# Patient Record
Sex: FEMALE | Race: WHITE | NOT HISPANIC OR LATINO | URBAN - METROPOLITAN AREA
[De-identification: names, ages, dates, MRNs, and addresses within clinical notes are randomized per-mention and may not be internally consistent; named-entity substitution may affect disease eponyms.]

---

## 2017-12-13 ENCOUNTER — OFFICE (OUTPATIENT)
Dept: URBAN - METROPOLITAN AREA CLINIC 101 | Facility: CLINIC | Age: 77
End: 2017-12-13
Payer: COMMERCIAL

## 2017-12-13 VITALS
WEIGHT: 153 LBS | HEART RATE: 73 BPM | DIASTOLIC BLOOD PRESSURE: 72 MMHG | SYSTOLIC BLOOD PRESSURE: 119 MMHG | HEIGHT: 61 IN | TEMPERATURE: 97.7 F

## 2017-12-13 DIAGNOSIS — R10.2 PELVIC AND PERINEAL PAIN: ICD-10-CM

## 2017-12-13 DIAGNOSIS — R13.19 OTHER DYSPHAGIA: ICD-10-CM

## 2017-12-13 DIAGNOSIS — K50.90 CROHN'S DISEASE, UNSPECIFIED, WITHOUT COMPLICATIONS: ICD-10-CM

## 2017-12-13 PROCEDURE — 99213 OFFICE O/P EST LOW 20 MIN: CPT

## 2017-12-13 NOTE — SERVICEHPINOTES
Ms. Toro returns for follow up. Her  passed away in February. She is doing ok and now seeing a grief counselor. She notes that she is still suffering from pelvic pain--continues to see a uro/gynecologist as well as a PT and pain specialist. She is on all of her same medications for her chronic pain. She wants to increase her dicyclomine use but is worried about getting a dry mouth from this. She has not tried FDgard. She is due for a colonoscopy this year for a hx of quiescent crohn's but she wants to hold off until after the holidays for this. She denies constipation, diarrhea, and rectal bleeding. She notes mild dysphagia again to certain things such as pills which she has had in the past. No other GI related complaints today.

## 2018-04-17 ENCOUNTER — OFFICE (OUTPATIENT)
Dept: URBAN - METROPOLITAN AREA CLINIC 101 | Facility: CLINIC | Age: 78
End: 2018-04-17

## 2018-04-17 PROCEDURE — 00031: CPT

## 2018-04-25 ENCOUNTER — ON CAMPUS - OUTPATIENT (OUTPATIENT)
Dept: URBAN - METROPOLITAN AREA HOSPITAL 35 | Facility: HOSPITAL | Age: 78
End: 2018-04-25
Payer: COMMERCIAL

## 2018-04-25 DIAGNOSIS — K29.60 OTHER GASTRITIS WITHOUT BLEEDING: ICD-10-CM

## 2018-04-25 DIAGNOSIS — K31.7 POLYP OF STOMACH AND DUODENUM: ICD-10-CM

## 2018-04-25 DIAGNOSIS — K51.80 OTHER ULCERATIVE COLITIS WITHOUT COMPLICATIONS: ICD-10-CM

## 2018-04-25 DIAGNOSIS — R13.10 DYSPHAGIA, UNSPECIFIED: ICD-10-CM

## 2018-04-25 DIAGNOSIS — K63.3 ULCER OF INTESTINE: ICD-10-CM

## 2018-04-25 PROCEDURE — 43239 EGD BIOPSY SINGLE/MULTIPLE: CPT

## 2018-04-25 PROCEDURE — 45380 COLONOSCOPY AND BIOPSY: CPT

## 2018-11-21 ENCOUNTER — OFFICE (OUTPATIENT)
Dept: URBAN - METROPOLITAN AREA CLINIC 33 | Facility: CLINIC | Age: 78
End: 2018-11-21
Payer: COMMERCIAL

## 2018-11-21 VITALS
DIASTOLIC BLOOD PRESSURE: 77 MMHG | HEIGHT: 61 IN | WEIGHT: 133 LBS | TEMPERATURE: 97.5 F | SYSTOLIC BLOOD PRESSURE: 123 MMHG | HEART RATE: 87 BPM

## 2018-11-21 DIAGNOSIS — R19.5 OTHER FECAL ABNORMALITIES: ICD-10-CM

## 2018-11-21 DIAGNOSIS — Z98.84 BARIATRIC SURGERY STATUS: ICD-10-CM

## 2018-11-21 DIAGNOSIS — D50.9 IRON DEFICIENCY ANEMIA, UNSPECIFIED: ICD-10-CM

## 2018-11-21 DIAGNOSIS — K50.10 CROHN'S DISEASE OF LARGE INTESTINE WITHOUT COMPLICATIONS: ICD-10-CM

## 2018-11-21 PROCEDURE — 99214 OFFICE O/P EST MOD 30 MIN: CPT

## 2018-11-21 NOTE — SERVICEHPINOTES
Ms. Toro is here to discuss fecal occult positive test. She saw her PCP in October for weight loss, loss of appetite, abdominal pain generalized, and tiredness. Ferritin is normal, vitamin b12, iron. + fecal occult test. Hgb is 9.8, hct is 91, and normal MCV. Upon chart review, patient has never been this anemic before. She felt like something was "off". In Feb of 2018, she had to sell her house which caused a lot of stress and anxiety. This is when her symptoms began. 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 No f/u is required". Last colonoscopy was in April of 2018.   She doesn't see any black or red stool. She will get abdominal cramps prior to a BM which is alleviated by a BM. She uses Ketamine nasal spray for her abdominal pain. Her stools are a type 4 on the BSS. She notes a lack of appetite. Has lost about 20 lbs since her symptoms began. She has a hx of hematuria under care of urology for this but this is actually better than previously. She has been following with Dr. Alves who recommended that she increase her iron to BID which she has yet to do. Hasn't seen him since the hgb and hct have returned low does have MGUS. BR

## 2019-01-02 ENCOUNTER — OFFICE (OUTPATIENT)
Dept: URBAN - METROPOLITAN AREA CLINIC 33 | Facility: CLINIC | Age: 79
End: 2019-01-02

## 2019-01-02 VITALS
HEIGHT: 61 IN | WEIGHT: 137 LBS | TEMPERATURE: 97.5 F | HEART RATE: 85 BPM | DIASTOLIC BLOOD PRESSURE: 76 MMHG | SYSTOLIC BLOOD PRESSURE: 128 MMHG

## 2019-01-02 DIAGNOSIS — D50.9 IRON DEFICIENCY ANEMIA, UNSPECIFIED: ICD-10-CM

## 2019-01-02 PROCEDURE — 99214 OFFICE O/P EST MOD 30 MIN: CPT

## 2019-01-02 NOTE — SERVICEHPINOTES
Ms. Toro is here to discuss fecal occult positive test. She saw her PCP in October for weight loss, loss of appetite, abdominal pain generalized, and tiredness. Ferritin is normal, vitamin b12, iron. + fecal occult test. Hgb is 9.8, hct is 91, and normal MCV. Upon chart review, patient has never been this anemic before She doesn't see any black or red stool. She will get abdominal cramps prior to a BM which is alleviated by a BM. She uses Ketamine nasal spray for her abdominal pain. Her stools are a type 4 on the BSS. She notes a lack of appetite. Has lost about 20 lbs since her symptoms began. She has a hx of hematuria under care of urology for this but this is actually better than previously. She has been following with Dr. Alves who recommended that she start iron which she has once a day. Hasn't seen him since the hgb and hct have returned low does have MGUS and just followed with her hematologist. She is here today to discuss a capsule study. She is unable to tolerate regular preps except for Prepopik. No sig abnormal pain. No nausea, vomiting. Stools are perfectly formed but a type 1 on the BSS since starting iron. She has hematuria but this has been attributed to IC ?

## 2019-02-13 ENCOUNTER — OFFICE (OUTPATIENT)
Dept: URBAN - METROPOLITAN AREA CLINIC 33 | Facility: CLINIC | Age: 79
End: 2019-02-13
Payer: COMMERCIAL

## 2019-02-13 DIAGNOSIS — D50.9 IRON DEFICIENCY ANEMIA, UNSPECIFIED: ICD-10-CM

## 2019-02-13 DIAGNOSIS — K29.60 OTHER GASTRITIS WITHOUT BLEEDING: ICD-10-CM

## 2019-02-13 PROCEDURE — 91110 GI TRC IMG INTRAL ESOPH-ILE: CPT

## 2019-04-30 ENCOUNTER — OFFICE (OUTPATIENT)
Dept: URBAN - METROPOLITAN AREA CLINIC 33 | Facility: CLINIC | Age: 79
End: 2019-04-30
Payer: COMMERCIAL

## 2019-04-30 VITALS
TEMPERATURE: 97.7 F | DIASTOLIC BLOOD PRESSURE: 83 MMHG | HEART RATE: 63 BPM | SYSTOLIC BLOOD PRESSURE: 124 MMHG | HEIGHT: 61 IN | WEIGHT: 135 LBS

## 2019-04-30 DIAGNOSIS — D50.9 IRON DEFICIENCY ANEMIA, UNSPECIFIED: ICD-10-CM

## 2019-04-30 DIAGNOSIS — R19.5 OTHER FECAL ABNORMALITIES: ICD-10-CM

## 2019-04-30 DIAGNOSIS — R10.84 GENERALIZED ABDOMINAL PAIN: ICD-10-CM

## 2019-04-30 PROCEDURE — 99214 OFFICE O/P EST MOD 30 MIN: CPT

## 2019-04-30 NOTE — SERVICEHPINOTES
Ms. Toro returns for f/u. She underwent iron infusions in March which seemed to help but now she is feeling more tired she has a f/u with hematology soon. She underwent a wireless capsule endoscopy which showed a normal small bowel. Colonoscopy showed active crohn's colitis. We then updated a fecal calprotectin which was 364 and a normal CRP. She is here today to discuss increasing therapy for her IBD. Her main symptom remains pain which is usually before a BM but very pronounced after a bowel movement. Stools tend to be a type 1 or 4 on the BSS has between 1-4  bowel movements per day. No blood in stool, nausea and very rare vomiting only if she overeats or eats "extremely bulky food" but she attributes this to her lab band. She has taken budesonide in the past but not recently. She continues to take Asacol 800 mg four pills per day. No other GI related complaints today.

## 2019-09-03 ENCOUNTER — OFFICE (OUTPATIENT)
Dept: URBAN - METROPOLITAN AREA CLINIC 33 | Facility: CLINIC | Age: 79
End: 2019-09-03
Payer: COMMERCIAL

## 2019-09-03 VITALS
HEART RATE: 83 BPM | HEIGHT: 61 IN | SYSTOLIC BLOOD PRESSURE: 146 MMHG | TEMPERATURE: 97.7 F | WEIGHT: 154 LBS | DIASTOLIC BLOOD PRESSURE: 88 MMHG

## 2019-09-03 DIAGNOSIS — K50.80 CROHN'S DISEASE OF BOTH SMALL AND LARGE INTESTINE WITHOUT CO: ICD-10-CM

## 2019-09-03 DIAGNOSIS — K52.89 OTHER SPECIFIED NONINFECTIVE GASTROENTERITIS AND COLITIS: ICD-10-CM

## 2019-09-03 PROCEDURE — 99214 OFFICE O/P EST MOD 30 MIN: CPT

## 2019-11-14 ENCOUNTER — AMBULATORY SURGICAL CENTER (OUTPATIENT)
Dept: URBAN - METROPOLITAN AREA SURGERY 21 | Facility: SURGERY | Age: 79
End: 2019-11-14
Payer: COMMERCIAL

## 2019-11-14 DIAGNOSIS — K50.80 CROHN'S DISEASE OF BOTH SMALL AND LARGE INTESTINE WITHOUT CO: ICD-10-CM

## 2019-11-14 DIAGNOSIS — K63.89 OTHER SPECIFIED DISEASES OF INTESTINE: ICD-10-CM

## 2019-11-14 PROCEDURE — 45380 COLONOSCOPY AND BIOPSY: CPT

## 2019-12-10 ENCOUNTER — OFFICE (OUTPATIENT)
Dept: URBAN - METROPOLITAN AREA CLINIC 33 | Facility: CLINIC | Age: 79
End: 2019-12-10
Payer: COMMERCIAL

## 2019-12-10 VITALS
HEART RATE: 83 BPM | WEIGHT: 156 LBS | HEIGHT: 61 IN | DIASTOLIC BLOOD PRESSURE: 67 MMHG | SYSTOLIC BLOOD PRESSURE: 101 MMHG | TEMPERATURE: 97.3 F

## 2019-12-10 DIAGNOSIS — K50.10 CROHN'S DISEASE OF LARGE INTESTINE WITHOUT COMPLICATIONS: ICD-10-CM

## 2019-12-10 DIAGNOSIS — D50.9 IRON DEFICIENCY ANEMIA, UNSPECIFIED: ICD-10-CM

## 2019-12-10 PROCEDURE — 99214 OFFICE O/P EST MOD 30 MIN: CPT

## 2019-12-10 NOTE — SERVICEHPINOTES
Ms. Toro is here for f/u regarding Crohn's colitis. She has been following with Dr. Soto, can get cramping and blood in stool.     She was on entocort for awhile. Her Calprotectin level was elevated so Dr. Soto previously d/w her the possibility of adding Entyvio to her regimen. She is being followed by Dr. Alves for anemia, which I suspect will be related to her colitis. She is also being evaluated for MGUS. 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 She just was dx with a cancerous bladder tumor. She has constant pelvic pain which she believes is d/t from the tumor.